# Patient Record
Sex: FEMALE
[De-identification: names, ages, dates, MRNs, and addresses within clinical notes are randomized per-mention and may not be internally consistent; named-entity substitution may affect disease eponyms.]

---

## 2020-01-15 PROBLEM — Z00.00 ENCOUNTER FOR PREVENTIVE HEALTH EXAMINATION: Status: ACTIVE | Noted: 2020-01-15

## 2020-01-16 ENCOUNTER — APPOINTMENT (OUTPATIENT)
Dept: ENDOCRINOLOGY | Facility: CLINIC | Age: 30
End: 2020-01-16
Payer: COMMERCIAL

## 2020-01-16 VITALS
SYSTOLIC BLOOD PRESSURE: 116 MMHG | HEIGHT: 62 IN | DIASTOLIC BLOOD PRESSURE: 70 MMHG | BODY MASS INDEX: 25.03 KG/M2 | HEART RATE: 88 BPM | WEIGHT: 136 LBS | OXYGEN SATURATION: 98 %

## 2020-01-16 LAB — GLUCOSE BLDC GLUCOMTR-MCNC: 88

## 2020-01-16 PROCEDURE — G0108 DIAB MANAGE TRN  PER INDIV: CPT

## 2020-01-16 PROCEDURE — 99204 OFFICE O/P NEW MOD 45 MIN: CPT | Mod: 25

## 2020-01-16 PROCEDURE — 82962 GLUCOSE BLOOD TEST: CPT

## 2020-01-16 RX ORDER — BLOOD SUGAR DIAGNOSTIC
STRIP MISCELLANEOUS 4 TIMES DAILY
Qty: 400 | Refills: 1 | Status: ACTIVE | COMMUNITY
Start: 2020-01-16 | End: 1900-01-01

## 2020-01-16 RX ORDER — LANCETS 30 GAUGE
EACH MISCELLANEOUS
Qty: 400 | Refills: 1 | Status: ACTIVE | COMMUNITY
Start: 2020-01-16 | End: 1900-01-01

## 2020-01-16 RX ORDER — URINE ACETONE TEST STRIPS
STRIP MISCELLANEOUS
Qty: 1 | Refills: 2 | Status: ACTIVE | COMMUNITY
Start: 2020-01-16 | End: 1900-01-01

## 2020-01-16 NOTE — REVIEW OF SYSTEMS
[Fatigue] : fatigue [Recent Weight Gain (___ Lbs)] : recent [unfilled] ~Ulb weight gain [SOB on Exertion] : shortness of breath during exertion [Dry Skin] : dry skin [Polydipsia] : polydipsia [Visual Field Defect] : no visual field defect [Blurry Vision] : no blurred vision [Dysphagia] : no dysphagia [Dysphonia] : no dysphonia [Neck Pain] : no neck pain [Palpitations] : no palpitations [Chest Pain] : no chest pain [Shortness Of Breath] : no shortness of breath [Nausea] : no nausea [Wheezing] : no wheezing was heard [Constipation] : no constipation [Vomiting] : no vomiting was observed [Diarrhea] : no diarrhea [Nocturia] : no nocturia [Polyuria] : no polyuria [Joint Stiffness] : no joint stiffness [Acanthosis] : no acanthosis  [Joint Pain] : no joint pain [Hair Loss] : no hair loss [Headache] : no headaches [Tremors] : no tremors [Depression] : no depression [Anxiety] : no anxiety [Heat Intolerance] : heat tolerant [Cold Intolerance] : cold tolerant [de-identified] : at night

## 2020-01-16 NOTE — HISTORY OF PRESENT ILLNESS
[FreeTextEntry1] : GDM\par Currently 29 weeks pregnant\par Diagnosed: Failed both 1 hour and 3 hour GTT at 28 weeks \par GTT results:  1 , 2 , 3 \par History of PCOS: no\par Family history of diabetes: no\par \par 2nd pregnancy-doesnt know gender\par First pregnancy 10/207- Girl- No GDM, no complications with pregnancy or birth- Was 9 days late for first pregnancy, vaginal delivery \par \par SMBG\par 86 in office- two eggs and toast \par \par Current drug regimen\par No GDM regimen\par Prenatal vitamins\par \par Weight: Has gained about 20-25 lbs\par Diet: Both her and  share cooking\par B: Green tea with honey, yogurt with granola \par L: leftovers\par D: Pasta, protein/veggie/starch\par S: banana\par Exercise: has been walking\par Smoking: denies\par Occupation: teacher \par \par OB: Dr. Witt\par LMP: 6/2019\par SUNDAR: 3/31/2020\par \par Sonograms: Last sono at 20 weeks, normal as per OB, unsure of next sonogram \par \par Plan to breastfeed: Plans to

## 2020-01-16 NOTE — PHYSICAL EXAM
[Alert] : alert [No Acute Distress] : no acute distress [Well Developed] : well developed [Well Nourished] : well nourished [Normal Hearing] : hearing was normal [Normal Sclera/Conjunctiva] : normal sclera/conjunctiva [Supple] : the neck was supple [No Accessory Muscle Use] : no accessory muscle use [Normal Rate and Effort] : normal respiratory rhythm and effort [Clear to Auscultation] : lungs were clear to auscultation bilaterally [Normal S1, S2] : normal S1 and S2 [Normal Rate] : heart rate was normal  [Regular Rhythm] : with a regular rhythm [No Edema] : there was no peripheral edema [Not Tender] : non-tender [Soft] : abdomen soft [Post Cervical Nodes] : posterior cervical nodes [Anterior Cervical Nodes] : anterior cervical nodes [Normal] : normal and non tender [Normal Gait] : normal gait [No Rash] : no rash [Acanthosis Nigricans] : no acanthosis nigricans [No Tremors] : no tremors [Oriented x3] : oriented to person, place, and time

## 2020-01-16 NOTE — ASSESSMENT
[FreeTextEntry1] : GDM\par -reviewed gestational diabetes risks to patient and fetus\par -starting self monitoring blood glucose 4 x day, meter given\par -send logs in one week\par -meet CDE today, discuss GDM diet\par -monitor urine ketones\par -a1c and TFTs lab performed\par -rto in 3 weeks

## 2020-01-17 LAB
ESTIMATED AVERAGE GLUCOSE: 91 MG/DL
HBA1C MFR BLD HPLC: 4.8 %
T3FREE SERPL-MCNC: 2.44 PG/ML
T4 FREE SERPL-MCNC: 1 NG/DL
TSH SERPL-ACNC: 1.47 UIU/ML

## 2020-01-21 ENCOUNTER — TRANSCRIPTION ENCOUNTER (OUTPATIENT)
Age: 30
End: 2020-01-21

## 2020-01-23 ENCOUNTER — TRANSCRIPTION ENCOUNTER (OUTPATIENT)
Age: 30
End: 2020-01-23

## 2020-01-27 ENCOUNTER — TRANSCRIPTION ENCOUNTER (OUTPATIENT)
Age: 30
End: 2020-01-27

## 2020-01-29 ENCOUNTER — TRANSCRIPTION ENCOUNTER (OUTPATIENT)
Age: 30
End: 2020-01-29

## 2020-02-04 ENCOUNTER — TRANSCRIPTION ENCOUNTER (OUTPATIENT)
Age: 30
End: 2020-02-04

## 2020-02-06 ENCOUNTER — APPOINTMENT (OUTPATIENT)
Dept: ENDOCRINOLOGY | Facility: CLINIC | Age: 30
End: 2020-02-06
Payer: COMMERCIAL

## 2020-02-06 VITALS
HEART RATE: 74 BPM | DIASTOLIC BLOOD PRESSURE: 82 MMHG | HEIGHT: 62 IN | WEIGHT: 135 LBS | BODY MASS INDEX: 24.84 KG/M2 | OXYGEN SATURATION: 99 % | SYSTOLIC BLOOD PRESSURE: 120 MMHG

## 2020-02-06 LAB — GLUCOSE BLDC GLUCOMTR-MCNC: 84

## 2020-02-06 PROCEDURE — 99213 OFFICE O/P EST LOW 20 MIN: CPT | Mod: 25

## 2020-02-06 PROCEDURE — 82962 GLUCOSE BLOOD TEST: CPT

## 2020-02-06 NOTE — HISTORY OF PRESENT ILLNESS
[FreeTextEntry1] : Quality: GDM\par Onset: 29 weeks pregnant\par Diagnosed: Failed both 1 hour and 3 hour GTT at 28 weeks \par GTT results:  1 , 2 , 3 \par History of PCOS: no\par Family history of diabetes: no\par \par 2nd pregnancy-doesnt know gender\par First pregnancy 10/207- Girl- No GDM, no complications with pregnancy or birth- Was 9 days late for first pregnancy, vaginal delivery \par \par SMB times a day\par before breakfast: 82, 88, 82\par after breakfast:  85, 86, 90\par after lunch: 94, 76, 90\par after dinner: 87, 94\par \par \par Current drug regimen\par No GDM regimen\par Prenatal vitamins\par \par OB: Dr. Brownlee\par LMP: 2019\par SUNDAR: 3/31/2020\par \par Sonograms: 2/3/20 - 50th percentile and fluid was fine \par \par Plan to breastfeed: Plans to

## 2020-02-06 NOTE — ASSESSMENT
[FreeTextEntry1] : 30 y/o female with GDM. Labs reviewed from 1/17/20 - A1C 4.8%, TSH 1.47, Free T4 1.0, Free T3 2.44\par \par GDM: controlled with diet \par -continue self monitoring blood glucose 4 x day\par -send logs in one week\par -continue to monitor urine ketones\par -follow up visit in 2-3 weeks

## 2020-02-06 NOTE — REVIEW OF SYSTEMS
[Recent Weight Gain (___ Lbs)] : recent [unfilled] ~Ulb weight gain [Fatigue] : no fatigue [Visual Field Defect] : no visual field defect [Decreased Appetite] : appetite not decreased [Blurry Vision] : no blurred vision [Dysphagia] : no dysphagia [Neck Pain] : no neck pain [Dysphonia] : no dysphonia [Chest Pain] : no chest pain [Constipation] : no constipation [Palpitations] : no palpitations [Diarrhea] : no diarrhea [Polyuria] : no polyuria [Headache] : no headaches [Dysuria] : no dysuria [Depression] : no depression [Tremors] : no tremors [Anxiety] : no anxiety [Polydipsia] : no polydipsia [Cold Intolerance] : cold tolerant [Heat Intolerance] : heat tolerant [Easy Bruising] : no tendency for easy bruising [Swelling] : no swelling

## 2020-02-06 NOTE — PHYSICAL EXAM
[Alert] : alert [No Acute Distress] : no acute distress [Well Developed] : well developed [Well Nourished] : well nourished [Normal Sclera/Conjunctiva] : normal sclera/conjunctiva [EOMI] : extra ocular movement intact [Supple] : the neck was supple [Thyroid Not Enlarged] : the thyroid was not enlarged [No LAD] : no lymphadenopathy [Normal Rate and Effort] : normal respiratory rhythm and effort [Clear to Auscultation] : lungs were clear to auscultation bilaterally [Normal Rate] : heart rate was normal  [No Accessory Muscle Use] : no accessory muscle use [Regular Rhythm] : with a regular rhythm [Normal S1, S2] : normal S1 and S2 [Not Tender] : non-tender [Normal Bowel Sounds] : normal bowel sounds [No Rash] : no rash [Normal Gait] : normal gait [No Motor Deficits] : the motor exam was normal [No Tremors] : no tremors [Oriented x3] : oriented to person, place, and time [Normal Insight/Judgement] : insight and judgment were intact [Normal Mood] : the mood was normal [Acanthosis Nigricans] : no acanthosis nigricans [de-identified] : gravid

## 2020-02-11 ENCOUNTER — TRANSCRIPTION ENCOUNTER (OUTPATIENT)
Age: 30
End: 2020-02-11

## 2020-02-19 ENCOUNTER — TRANSCRIPTION ENCOUNTER (OUTPATIENT)
Age: 30
End: 2020-02-19

## 2020-02-25 ENCOUNTER — TRANSCRIPTION ENCOUNTER (OUTPATIENT)
Age: 30
End: 2020-02-25

## 2020-02-27 ENCOUNTER — APPOINTMENT (OUTPATIENT)
Dept: ENDOCRINOLOGY | Facility: CLINIC | Age: 30
End: 2020-02-27
Payer: COMMERCIAL

## 2020-02-27 VITALS
HEART RATE: 87 BPM | BODY MASS INDEX: 24.84 KG/M2 | WEIGHT: 135 LBS | OXYGEN SATURATION: 98 % | SYSTOLIC BLOOD PRESSURE: 110 MMHG | DIASTOLIC BLOOD PRESSURE: 80 MMHG | HEIGHT: 62 IN

## 2020-02-27 DIAGNOSIS — O24.419 GESTATIONAL DIABETES MELLITUS IN PREGNANCY, UNSPECIFIED CONTROL: ICD-10-CM

## 2020-02-27 LAB — GLUCOSE BLDC GLUCOMTR-MCNC: 78

## 2020-02-27 PROCEDURE — 99213 OFFICE O/P EST LOW 20 MIN: CPT | Mod: 25

## 2020-02-27 PROCEDURE — 82962 GLUCOSE BLOOD TEST: CPT

## 2020-02-27 NOTE — REVIEW OF SYSTEMS
[Fatigue] : fatigue [Recent Weight Gain (___ Lbs)] : recent [unfilled] ~Ulb weight gain [Polyuria] : polyuria [Nocturia] : nocturia [Visual Field Defect] : no visual field defect [Blurry Vision] : no blurred vision [Dysphonia] : no dysphonia [Neck Pain] : no neck pain [Dysphagia] : no dysphagia [Palpitations] : no palpitations [Shortness Of Breath] : no shortness of breath [Chest Pain] : no chest pain [Constipation] : no constipation [Nausea] : no nausea [Vomiting] : no vomiting was observed [Dysuria] : no dysuria [Joint Stiffness] : no joint stiffness [Diarrhea] : no diarrhea [Hair Loss] : no hair loss [Dry Skin] : no dry skin [Headache] : no headaches [Tremors] : no tremors [Anxiety] : no anxiety [Depression] : no depression [Polydipsia] : no polydipsia [Cold Intolerance] : cold tolerant [Heat Intolerance] : heat tolerant

## 2020-02-27 NOTE — ASSESSMENT
[FreeTextEntry1] : GDM\par -Continue on a diet controlled regimen for GDM.\par -Continue to check BS 4x a day and send logs \par -Explained protocol for post partum and what to expect at her follow up apt\par -Pt to let us know when she delivers /if she is induced/csection \par \par RTO 5/2020 after delivery

## 2020-02-27 NOTE — HISTORY OF PRESENT ILLNESS
[FreeTextEntry1] : GDM\par Currently 36 weeks pregnant\par Diagnosed: Failed both 1 hour and 3 hour GTT at 28 weeks \par GTT results:  1 , 2 , 3 \par History of PCOS: no\par Family history of diabetes: no\par \par 2nd pregnancy-doesnt know gender\par First pregnancy 10/207- Girl- No GDM, no complications with pregnancy or birth- Was 9 days late for first pregnancy, vaginal delivery \par \par SMBG\par 76 in office-salad, grilled chicken, almonds, berries \par Logs scanned in- fasting consistently lower 90 and one hour after meals consistently less than 120 \par \par Current drug regimen\par No GDM regimen-Diet controlled \par Prenatal vitamins\par \par Weight: Has gained about 28 lbs\par Diet: Both her and  share cooking\par B: Green tea with honey, yogurt with granola \par L: leftovers\par D: Pasta, protein/veggie/starch\par S: banana\par Exercise: has been walking\par Smoking: denies\par Occupation: teacher \par \par OB: Dr. Witt-delivers at [place] \par LMP: 6/2019\par SUNDAR: 3/31/2020\par \par Sonograms: Last sono 34 weeks pregnant -baby was at 65-69% \par \par Plan to breastfeed: Plans to

## 2020-02-27 NOTE — PHYSICAL EXAM
[Alert] : alert [No Acute Distress] : no acute distress [Well Developed] : well developed [Normal Sclera/Conjunctiva] : normal sclera/conjunctiva [Well Nourished] : well nourished [Normal Hearing] : hearing was normal [Supple] : the neck was supple [No Accessory Muscle Use] : no accessory muscle use [Normal Rate and Effort] : normal respiratory rhythm and effort [Clear to Auscultation] : lungs were clear to auscultation bilaterally [Normal S1, S2] : normal S1 and S2 [Normal Rate] : heart rate was normal  [Regular Rhythm] : with a regular rhythm [No Edema] : there was no peripheral edema [Not Tender] : non-tender [Soft] : abdomen soft [Post Cervical Nodes] : posterior cervical nodes [Anterior Cervical Nodes] : anterior cervical nodes [Normal] : normal and non tender [Normal Gait] : normal gait [No Rash] : no rash [No Tremors] : no tremors [Oriented x3] : oriented to person, place, and time [Acanthosis Nigricans] : no acanthosis nigricans

## 2020-03-06 ENCOUNTER — TRANSCRIPTION ENCOUNTER (OUTPATIENT)
Age: 30
End: 2020-03-06

## 2020-03-11 ENCOUNTER — TRANSCRIPTION ENCOUNTER (OUTPATIENT)
Age: 30
End: 2020-03-11

## 2020-03-17 ENCOUNTER — TRANSCRIPTION ENCOUNTER (OUTPATIENT)
Age: 30
End: 2020-03-17

## 2020-03-26 ENCOUNTER — TRANSCRIPTION ENCOUNTER (OUTPATIENT)
Age: 30
End: 2020-03-26

## 2020-03-27 ENCOUNTER — TRANSCRIPTION ENCOUNTER (OUTPATIENT)
Age: 30
End: 2020-03-27

## 2020-05-21 ENCOUNTER — APPOINTMENT (OUTPATIENT)
Dept: ENDOCRINOLOGY | Facility: CLINIC | Age: 30
End: 2020-05-21

## 2024-08-26 ENCOUNTER — APPOINTMENT (OUTPATIENT)
Dept: ENDOCRINOLOGY | Facility: CLINIC | Age: 34
End: 2024-08-26
Payer: COMMERCIAL

## 2024-08-26 ENCOUNTER — RESULT CHARGE (OUTPATIENT)
Age: 34
End: 2024-08-26

## 2024-08-26 VITALS
HEART RATE: 96 BPM | HEIGHT: 62 IN | BODY MASS INDEX: 25.58 KG/M2 | OXYGEN SATURATION: 99 % | DIASTOLIC BLOOD PRESSURE: 62 MMHG | SYSTOLIC BLOOD PRESSURE: 110 MMHG | WEIGHT: 139 LBS

## 2024-08-26 DIAGNOSIS — O24.419 GESTATIONAL DIABETES MELLITUS IN PREGNANCY, UNSPECIFIED CONTROL: ICD-10-CM

## 2024-08-26 LAB — GLUCOSE BLDC GLUCOMTR-MCNC: 109

## 2024-08-26 PROCEDURE — G0108 DIAB MANAGE TRN  PER INDIV: CPT

## 2024-08-26 PROCEDURE — 82962 GLUCOSE BLOOD TEST: CPT

## 2024-08-26 PROCEDURE — 99204 OFFICE O/P NEW MOD 45 MIN: CPT

## 2024-08-26 NOTE — PHYSICAL EXAM
[Alert] : alert [Well Nourished] : well nourished [No Acute Distress] : no acute distress [Well Developed] : well developed [Normal Sclera/Conjunctiva] : normal sclera/conjunctiva [No Proptosis] : no proptosis [No LAD] : no lymphadenopathy [Thyroid Not Enlarged] : the thyroid was not enlarged [No Thyroid Nodules] : no palpable thyroid nodules [No Respiratory Distress] : no respiratory distress [No Accessory Muscle Use] : no accessory muscle use [Normal Rate and Effort] : normal respiratory rate and effort [Clear to Auscultation] : lungs were clear to auscultation bilaterally [Normal S1, S2] : normal S1 and S2 [Normal Rate] : heart rate was normal [Regular Rhythm] : with a regular rhythm [No Stigmata of Cushings Syndrome] : no stigmata of Cushings Syndrome [No Rash] : no rash [No Tremors] : no tremors [Oriented x3] : oriented to person, place, and time [Normal Affect] : the affect was normal [Normal Insight/Judgement] : insight and judgment were intact [Normal Mood] : the mood was normal [Acanthosis Nigricans] : no acanthosis nigricans [de-identified] : gravid

## 2024-08-26 NOTE — ASSESSMENT
[FreeTextEntry1] : 33 y/o female  with recurrent GDM.   Plan:  Start testing blood sugars 4 times a day with goals of fasting <90 and 1 hour postprandial <120, if blood sugars above goal then will initiate insulin. Start urine ketone testing in the morning. Start GDM diet, meeting with CDE today. Send in logs on Friday. Check A1c and TFTs now  Diet (carbohydrates 15 grams with breakfast, 45 grams with lunch and 60 grams with dinner, 15-30 grams with snacks. Balance with lean proteins, high fiber and low fat diet.)  Exercise daily as tolerated, work up to 20- 30 minutes a day.   Medication, risks and benefits reviewed. ***Glucose testing and insulin/ medication *** administration for glycemic management    GDM Education:  Discussed the immediate risks to the mother with GDM are an increased incidence of  delivery (~30%), preeclampsia (~20-30%), and polyhydramnios (~20%) which can result in  labor.  The long-term risks to the mother regarding diabetes complications during pregnancy.  Counseling on diet, and exercise was given.  Fetal/Infant risks discussed and include macrosomia,  risk for shoulder dystocia/Erb's palsy, clavicular fractures, fetal distress, low APGAR scores, and even birth asphyxia when unrecognized, respiratory distress syndrome may occur in up to 31% of infants while cardiac septal hypertrophy may be seen in 35-40%., with extremely poor glucose control, there is also an increased risk of fetal mortality due to fetal acidemia and hypoxia. Common metabolic abnormalities in the infant of a diabetic mother include  hypoglycemia, hypocalcemia, hyperbilirubinemia and polycythemia.  hypoglycemia is common in women in suboptimal glycemic control. Also the long-term sequelae of hyperglycemia during pregnancy for offspring and increased risk of adolescent obesity and of Type 2 diabetes.   Breastfeeding encouraged. Fetal surveillance as per OB recommendations (fetal ultrasound, fetal movement records, nonstress test, biophysical profile)  Follow-up in 2 weeks; call if develop other problems/ questions. Call to report hypoglycemia or sustained hyperglycemia.

## 2024-08-26 NOTE — HISTORY OF PRESENT ILLNESS
[FreeTextEntry1] : Quality: GDM  Severity: moderate  Onset: 29 weeks' gestation  Glucose tolerance test results: 3 hour gtt 68/107/205/167 History of PCOS: Previous history of GDM: Yes, diet controlled Family History: younger brother weighed over 9 lbs.   Self blood sugar monitoring: none Current   Notes: OB: Dr. Brownlee   1st child born on 10/[birth year] weighed 7 lbs. 13 oz. vaginal birth (girl) non complications  2nd child born 3/26/20 weighed 7 lbs. 5 oz. vaginal birth (had GDM) (girl) no delivery complications    LMP: 24 EDC: 24 currently 29+ weeks gestation Last sonogram: last week 49th percentile weighs 2 lbs. 13 oz (boy - Jd)   Planning to breast feed: yes

## 2024-08-28 ENCOUNTER — TRANSCRIPTION ENCOUNTER (OUTPATIENT)
Age: 34
End: 2024-08-28

## 2024-09-02 ENCOUNTER — TRANSCRIPTION ENCOUNTER (OUTPATIENT)
Age: 34
End: 2024-09-02

## 2024-09-12 ENCOUNTER — APPOINTMENT (OUTPATIENT)
Dept: ENDOCRINOLOGY | Facility: CLINIC | Age: 34
End: 2024-09-12

## 2024-09-12 ENCOUNTER — TRANSCRIPTION ENCOUNTER (OUTPATIENT)
Age: 34
End: 2024-09-12

## 2024-09-13 ENCOUNTER — APPOINTMENT (OUTPATIENT)
Dept: ENDOCRINOLOGY | Facility: CLINIC | Age: 34
End: 2024-09-13
Payer: COMMERCIAL

## 2024-09-13 DIAGNOSIS — O24.419 GESTATIONAL DIABETES MELLITUS IN PREGNANCY, UNSPECIFIED CONTROL: ICD-10-CM

## 2024-09-13 PROCEDURE — G0108 DIAB MANAGE TRN  PER INDIV: CPT | Mod: 95

## 2024-09-17 ENCOUNTER — TRANSCRIPTION ENCOUNTER (OUTPATIENT)
Age: 34
End: 2024-09-17

## 2024-09-24 ENCOUNTER — APPOINTMENT (OUTPATIENT)
Dept: ENDOCRINOLOGY | Facility: CLINIC | Age: 34
End: 2024-09-24
Payer: COMMERCIAL

## 2024-09-24 VITALS
BODY MASS INDEX: 26.5 KG/M2 | WEIGHT: 144 LBS | HEIGHT: 62 IN | SYSTOLIC BLOOD PRESSURE: 104 MMHG | HEART RATE: 85 BPM | DIASTOLIC BLOOD PRESSURE: 62 MMHG | OXYGEN SATURATION: 99 %

## 2024-09-24 DIAGNOSIS — O24.419 GESTATIONAL DIABETES MELLITUS IN PREGNANCY, UNSPECIFIED CONTROL: ICD-10-CM

## 2024-09-24 LAB — GLUCOSE BLDC GLUCOMTR-MCNC: 101

## 2024-09-24 PROCEDURE — 99214 OFFICE O/P EST MOD 30 MIN: CPT

## 2024-09-24 PROCEDURE — 82962 GLUCOSE BLOOD TEST: CPT

## 2024-09-24 NOTE — ASSESSMENT
[FreeTextEntry1] : 33 y/o female  with recurrent GDM.  Plan: GDM - controlled with diet  Continue testing blood sugars 4 times a day with goals of fasting <90 and 1 hour postprandial <120, if blood sugars above goal then will initiate insulin. Continue urine ketone testing in the morning. Continue GDM diet. Send in logs in 1 week.  Check A1c and TFTs now  Diet (carbohydrates 15 grams with breakfast, 45 grams with lunch and 60 grams with dinner, 15-30 grams with snacks. Balance with lean proteins, high fiber and low fat diet.)  Exercise daily as tolerated, work up to 20- 30 minutes a day.  Medication, risks and benefits reviewed. ***Glucose testing and insulin/ medication *** administration for glycemic management  GDM Education: Discussed the immediate risks to the mother with GDM are an increased incidence of  delivery (~30%), preeclampsia (~20-30%), and polyhydramnios (~20%) which can result in  labor. The long-term risks to the mother regarding diabetes complications during pregnancy. Counseling on diet, and exercise was given. Fetal/Infant risks discussed and include macrosomia, risk for shoulder dystocia/Erb's palsy, clavicular fractures, fetal distress, low APGAR scores, and even birth asphyxia when unrecognized, respiratory distress syndrome may occur in up to 31% of infants while cardiac septal hypertrophy may be seen in 35-40%., with extremely poor glucose control, there is also an increased risk of fetal mortality due to fetal acidemia and hypoxia. Common metabolic abnormalities in the infant of a diabetic mother include  hypoglycemia, hypocalcemia, hyperbilirubinemia and polycythemia.  hypoglycemia is common in women in suboptimal glycemic control. Also the long-term sequelae of hyperglycemia during pregnancy for offspring and increased risk of adolescent obesity and of Type 2 diabetes.  Breastfeeding encouraged. Fetal surveillance as per OB recommendations (fetal ultrasound, fetal movement records, nonstress test, biophysical profile)  Follow-up in 3 weeks; call if develop other problems/ questions. Call to report hypoglycemia or sustained hyperglycemia.

## 2024-09-24 NOTE — HISTORY OF PRESENT ILLNESS
[FreeTextEntry1] : Quality: GDM Severity: moderate Onset: 29 weeks' gestation Glucose tolerance test results: 3 hour gtt 68/107/205/167 History of PCOS: Previous history of GDM: Yes, diet controlled Family History: younger brother weighed over 9 lbs.  Self blood sugar monitorin times a day, urine ketones once in the morning. -small,80,102,113,92 -none,76,95,118,104 -none,87,103,99,118 -none,86,96,101,110 -none,80,104,103,89 -none,86,99,107,117 -small,76,107,105,117 Today 79, 101, 118 Current   Notes: OB: Dr. Brownlee  1st child born on 10/[birth year] weighed 7 lbs. 13 oz. vaginal birth (girl) non complications 2nd child born 3/26/20 weighed 7 lbs. 5 oz. vaginal birth (had GDM) (girl) no delivery complications   LMP: 24 EDC: 24 currently 34 weeks gestation Last sonogram: one month ago 49th percentile weighs 2 lbs. 13 oz (boy - Jd) Last Monday fetus measuring 4 lbs. 11 oz, 52nd percentile   Planning to breast feed: yes

## 2024-10-02 ENCOUNTER — TRANSCRIPTION ENCOUNTER (OUTPATIENT)
Age: 34
End: 2024-10-02

## 2024-10-08 ENCOUNTER — TRANSCRIPTION ENCOUNTER (OUTPATIENT)
Age: 34
End: 2024-10-08

## 2024-10-15 ENCOUNTER — TRANSCRIPTION ENCOUNTER (OUTPATIENT)
Age: 34
End: 2024-10-15

## 2024-10-22 ENCOUNTER — TRANSCRIPTION ENCOUNTER (OUTPATIENT)
Age: 34
End: 2024-10-22

## 2024-10-22 ENCOUNTER — APPOINTMENT (OUTPATIENT)
Dept: ENDOCRINOLOGY | Facility: CLINIC | Age: 34
End: 2024-10-22

## 2024-10-23 ENCOUNTER — APPOINTMENT (OUTPATIENT)
Dept: ENDOCRINOLOGY | Facility: CLINIC | Age: 34
End: 2024-10-23
Payer: COMMERCIAL

## 2024-10-23 DIAGNOSIS — O24.419 GESTATIONAL DIABETES MELLITUS IN PREGNANCY, UNSPECIFIED CONTROL: ICD-10-CM

## 2024-10-23 PROCEDURE — 99213 OFFICE O/P EST LOW 20 MIN: CPT

## 2024-10-25 ENCOUNTER — APPOINTMENT (OUTPATIENT)
Dept: ENDOCRINOLOGY | Facility: CLINIC | Age: 34
End: 2024-10-25

## 2024-10-29 ENCOUNTER — TRANSCRIPTION ENCOUNTER (OUTPATIENT)
Age: 34
End: 2024-10-29

## 2024-11-08 ENCOUNTER — TRANSCRIPTION ENCOUNTER (OUTPATIENT)
Age: 34
End: 2024-11-08

## 2024-12-16 ENCOUNTER — APPOINTMENT (OUTPATIENT)
Dept: ENDOCRINOLOGY | Facility: CLINIC | Age: 34
End: 2024-12-16
Payer: COMMERCIAL

## 2024-12-16 ENCOUNTER — RESULT CHARGE (OUTPATIENT)
Age: 34
End: 2024-12-16

## 2024-12-16 VITALS
HEART RATE: 72 BPM | DIASTOLIC BLOOD PRESSURE: 60 MMHG | HEIGHT: 62 IN | WEIGHT: 124 LBS | SYSTOLIC BLOOD PRESSURE: 90 MMHG | OXYGEN SATURATION: 98 % | BODY MASS INDEX: 22.82 KG/M2

## 2024-12-16 DIAGNOSIS — Z13.1 ENCOUNTER FOR SCREENING FOR DIABETES MELLITUS: ICD-10-CM

## 2024-12-16 DIAGNOSIS — Z86.32 PERSONAL HISTORY OF GESTATIONAL DIABETES: ICD-10-CM

## 2024-12-16 LAB — GLUCOSE BLDC GLUCOMTR-MCNC: 101

## 2024-12-16 PROCEDURE — 82962 GLUCOSE BLOOD TEST: CPT

## 2024-12-16 PROCEDURE — 99213 OFFICE O/P EST LOW 20 MIN: CPT
